# Patient Record
Sex: MALE | Race: WHITE
[De-identification: names, ages, dates, MRNs, and addresses within clinical notes are randomized per-mention and may not be internally consistent; named-entity substitution may affect disease eponyms.]

---

## 2019-02-21 ENCOUNTER — HOSPITAL ENCOUNTER (OUTPATIENT)
Dept: HOSPITAL 47 - ORWHC2ENDO | Age: 64
Discharge: HOME | End: 2019-02-21
Payer: OTHER GOVERNMENT

## 2019-02-21 VITALS — BODY MASS INDEX: 28.8 KG/M2

## 2019-02-21 VITALS — DIASTOLIC BLOOD PRESSURE: 78 MMHG | SYSTOLIC BLOOD PRESSURE: 143 MMHG | HEART RATE: 78 BPM

## 2019-02-21 VITALS — TEMPERATURE: 97.7 F

## 2019-02-21 VITALS — RESPIRATION RATE: 18 BRPM

## 2019-02-21 DIAGNOSIS — Z87.891: ICD-10-CM

## 2019-02-21 DIAGNOSIS — Z79.899: ICD-10-CM

## 2019-02-21 DIAGNOSIS — K57.30: ICD-10-CM

## 2019-02-21 DIAGNOSIS — G35: ICD-10-CM

## 2019-02-21 DIAGNOSIS — K21.9: ICD-10-CM

## 2019-02-21 DIAGNOSIS — Z12.11: Primary | ICD-10-CM

## 2019-02-21 PROCEDURE — 45378 DIAGNOSTIC COLONOSCOPY: CPT

## 2019-02-21 NOTE — P.PCN
Date of Procedure: 02/21/19


Procedure(s) Performed: 


Procedure: Total colonoscopy.





Preoperative diagnosis: Screening for neoplasia.





Postoperative diagnosis: Sigmoid diverticulosis with no evidence of acute 

diverticulitis, strictures, polyps or cancer.





Preparation: HalfLytely prep.





Sedation: Was provided by anesthesia.





Brief clinical history: The patient is a 63-year-old male who is scheduled for 

this evaluation for screening for neoplasia age being his risk factor.  The 

patient has no family history of colon cancer and he does not have any 

abdominal complaints, bleeding or anemia.  This would be his first colonoscopy.





Procedure: With the patient on his left lateral decubitus position and after 

informed consent and adequate sedation, the perianal area was inspected and it 

did not show any fissures or fistulas.  There were no masses felt on digital 

rectal examination.  The Olympus CFH 190L video colonoscope was then inserted 

in the rectum in the usual fashion and advanced to the cecum.  There were 

several diverticular orifices seen scattered in the sigmoid but there was no 

evidence of acute diverticulitis or strictures.  No polyps or tumors were seen.

  The mucosa appeared healthy.  I retroflexed the endoscope in the rectum 

before the endoscope was withdrawn.





The patient tolerated the procedure well.





Plan: The patient was reassured.  Discussed dietary measures.  He will follow 

up with you as planned and consideration will be made for repeat exam in 10 

years.  He will discuss that with you.

## 2021-08-27 ENCOUNTER — HOSPITAL ENCOUNTER (OUTPATIENT)
Dept: HOSPITAL 47 - LABWHC1 | Age: 66
Discharge: HOME | End: 2021-08-27
Attending: NURSE PRACTITIONER
Payer: OTHER GOVERNMENT

## 2021-08-27 DIAGNOSIS — G35: Primary | ICD-10-CM

## 2021-08-27 LAB
BASOPHILS # BLD AUTO: 0.03 X 10*3/UL (ref 0–0.1)
BASOPHILS NFR BLD AUTO: 0.5 %
CALCIUM SPEC-MCNC: 9 MG/DL (ref 8.7–10.3)
EOSINOPHIL # BLD AUTO: 0.22 X 10*3/UL (ref 0.04–0.35)
EOSINOPHIL NFR BLD AUTO: 4 %
ERYTHROCYTE [DISTWIDTH] IN BLOOD BY AUTOMATED COUNT: 5.02 X 10*6/UL (ref 4.4–5.6)
ERYTHROCYTE [DISTWIDTH] IN BLOOD: 12.3 % (ref 11.5–14.5)
HBV SURFACE AB SERPL IA-ACNC: <3.5 MIU/ML
HCT VFR BLD AUTO: 43.1 % (ref 39.6–50)
HGB BLD-MCNC: 13.8 G/DL (ref 13–17)
LYMPHOCYTES # SPEC AUTO: 2.02 X 10*3/UL (ref 0.9–5)
LYMPHOCYTES NFR SPEC AUTO: 36.7 %
MCH RBC QN AUTO: 27.5 PG (ref 27–32)
MCHC RBC AUTO-ENTMCNC: 32 G/DL (ref 32–37)
MCV RBC AUTO: 85.9 FL (ref 80–97)
MONOCYTES # BLD AUTO: 0.4 X 10*3/UL (ref 0.2–1)
MONOCYTES NFR BLD AUTO: 7.3 %
NEUTROPHILS # BLD AUTO: 2.82 X 10*3/UL (ref 1.8–7.7)
NEUTROPHILS NFR BLD AUTO: 51.1 %
PLATELET # BLD AUTO: 230 X 10*3/UL (ref 140–440)
T4 FREE SERPL-MCNC: 0.9 NG/DL (ref 0.8–1.8)
VIT B12 SERPL-MCNC: 290 PG/ML (ref 200–944)
WBC # BLD AUTO: 5.51 X 10*3/UL (ref 4.5–10)

## 2021-08-27 PROCEDURE — 86705 HEP B CORE ANTIBODY IGM: CPT

## 2021-08-27 PROCEDURE — 84591 ASSAY OF NOS VITAMIN: CPT

## 2021-08-27 PROCEDURE — 85025 COMPLETE CBC W/AUTO DIFF WBC: CPT

## 2021-08-27 PROCEDURE — 86706 HEP B SURFACE ANTIBODY: CPT

## 2021-08-27 PROCEDURE — 82310 ASSAY OF CALCIUM: CPT

## 2021-08-27 PROCEDURE — 84443 ASSAY THYROID STIM HORMONE: CPT

## 2021-08-27 PROCEDURE — 82746 ASSAY OF FOLIC ACID SERUM: CPT

## 2021-08-27 PROCEDURE — 84481 FREE ASSAY (FT-3): CPT

## 2021-08-27 PROCEDURE — 87390 HIV-1 AG IA: CPT

## 2021-08-27 PROCEDURE — 84425 ASSAY OF VITAMIN B-1: CPT

## 2021-08-27 PROCEDURE — 36415 COLL VENOUS BLD VENIPUNCTURE: CPT

## 2021-08-27 PROCEDURE — 82306 VITAMIN D 25 HYDROXY: CPT

## 2021-08-27 PROCEDURE — 82607 VITAMIN B-12: CPT

## 2021-08-27 PROCEDURE — 84207 ASSAY OF VITAMIN B-6: CPT

## 2021-08-27 PROCEDURE — 86704 HEP B CORE ANTIBODY TOTAL: CPT

## 2021-08-27 PROCEDURE — 84439 ASSAY OF FREE THYROXINE: CPT

## 2021-08-27 PROCEDURE — 86787 VARICELLA-ZOSTER ANTIBODY: CPT

## 2021-08-27 PROCEDURE — 87340 HEPATITIS B SURFACE AG IA: CPT

## 2025-02-26 ENCOUNTER — HOSPITAL ENCOUNTER (OUTPATIENT)
Dept: HOSPITAL 47 - LABPAT | Age: 70
Discharge: HOME | End: 2025-02-26
Attending: ORTHOPAEDIC SURGERY
Payer: OTHER GOVERNMENT

## 2025-02-26 DIAGNOSIS — Z01.812: Primary | ICD-10-CM

## 2025-02-26 DIAGNOSIS — M17.12: ICD-10-CM

## 2025-02-26 DIAGNOSIS — Z22.322: ICD-10-CM

## 2025-02-26 PROCEDURE — 87070 CULTURE OTHR SPECIMN AEROBIC: CPT

## 2025-04-01 ENCOUNTER — HOSPITAL ENCOUNTER (OUTPATIENT)
Dept: HOSPITAL 47 - OR | Age: 70
LOS: 1 days | Discharge: HOME HEALTH | End: 2025-04-02
Attending: ORTHOPAEDIC SURGERY
Payer: OTHER GOVERNMENT

## 2025-04-01 VITALS — BODY MASS INDEX: 26.4 KG/M2

## 2025-04-01 DIAGNOSIS — I10: ICD-10-CM

## 2025-04-01 DIAGNOSIS — Z87.891: ICD-10-CM

## 2025-04-01 DIAGNOSIS — G35: ICD-10-CM

## 2025-04-01 DIAGNOSIS — M17.12: Primary | ICD-10-CM

## 2025-04-01 DIAGNOSIS — Z96.653: ICD-10-CM

## 2025-04-01 DIAGNOSIS — Z79.01: ICD-10-CM

## 2025-04-01 DIAGNOSIS — Z79.899: ICD-10-CM

## 2025-04-01 PROCEDURE — 97161 PT EVAL LOW COMPLEX 20 MIN: CPT

## 2025-04-01 PROCEDURE — 83735 ASSAY OF MAGNESIUM: CPT

## 2025-04-01 PROCEDURE — 27447 TOTAL KNEE ARTHROPLASTY: CPT

## 2025-04-01 PROCEDURE — 64448 NJX AA&/STRD FEM NRV NFS IMG: CPT

## 2025-04-01 PROCEDURE — 85025 COMPLETE CBC W/AUTO DIFF WBC: CPT

## 2025-04-01 PROCEDURE — 73560 X-RAY EXAM OF KNEE 1 OR 2: CPT

## 2025-04-01 PROCEDURE — 64474 LWR XTR FSCL PLN BLK UNI NFS: CPT

## 2025-04-01 PROCEDURE — 80048 BASIC METABOLIC PNL TOTAL CA: CPT

## 2025-04-01 RX ADMIN — POTASSIUM CHLORIDE ONE MLS: 14.9 INJECTION, SOLUTION INTRAVENOUS at 15:33

## 2025-04-01 RX ADMIN — MIDAZOLAM PRN MG: 1 INJECTION INTRAMUSCULAR; INTRAVENOUS at 07:12

## 2025-04-01 RX ADMIN — ACETAMINOPHEN PRN MG: 500 TABLET ORAL at 07:00

## 2025-04-01 RX ADMIN — SODIUM CHLORIDE PRN ML: 9 INJECTION, SOLUTION INTRAVENOUS at 09:48

## 2025-04-01 RX ADMIN — POTASSIUM CHLORIDE ONE MLS: 14.9 INJECTION, SOLUTION INTRAVENOUS at 08:00

## 2025-04-01 RX ADMIN — DOCUSATE SODIUM AND SENNOSIDES SCH EACH: 50; 8.6 TABLET ORAL at 23:20

## 2025-04-01 RX ADMIN — HYDROCODONE BITARTRATE AND ACETAMINOPHEN PRN EACH: 7.5; 325 TABLET ORAL at 16:45

## 2025-04-01 RX ADMIN — POTASSIUM CHLORIDE ONE MLS: 14.9 INJECTION, SOLUTION INTRAVENOUS at 07:05

## 2025-04-01 RX ADMIN — POTASSIUM CHLORIDE SCH MLS/HR: 14.9 INJECTION, SOLUTION INTRAVENOUS at 06:59

## 2025-04-01 RX ADMIN — CEFAZOLIN ONE MLS: 330 INJECTION, POWDER, FOR SOLUTION INTRAMUSCULAR; INTRAVENOUS at 07:55

## 2025-04-01 RX ADMIN — DEXAMETHASONE SODIUM PHOSPHATE ONE MG: 4 INJECTION, SOLUTION INTRAMUSCULAR; INTRAVENOUS at 06:59

## 2025-04-01 RX ADMIN — ONDANSETRON ONE MG: 2 INJECTION INTRAMUSCULAR; INTRAVENOUS at 07:00

## 2025-04-01 RX ADMIN — BRIMONIDINE TARTRATE SCH DROPS: 2 SOLUTION/ DROPS OPHTHALMIC at 23:21

## 2025-04-01 RX ADMIN — POTASSIUM CHLORIDE ONE MLS: 14.9 INJECTION, SOLUTION INTRAVENOUS at 08:37

## 2025-04-01 RX ADMIN — MELOXICAM PRN MG: 7.5 TABLET ORAL at 07:00

## 2025-04-01 RX ADMIN — HYDROMORPHONE HYDROCHLORIDE PRN MG: 1 INJECTION, SOLUTION INTRAMUSCULAR; INTRAVENOUS; SUBCUTANEOUS at 16:17

## 2025-04-01 RX ADMIN — TIMOLOL MALEATE SCH DROPS: 5 SOLUTION OPHTHALMIC at 23:21

## 2025-04-01 RX ADMIN — DORZOLAMIDE HYDROCHLORIDE SCH DROPS: 20 SOLUTION/ DROPS OPHTHALMIC at 23:22

## 2025-04-01 RX ADMIN — HYDROMORPHONE HYDROCHLORIDE PRN MG: 1 INJECTION, SOLUTION INTRAMUSCULAR; INTRAVENOUS; SUBCUTANEOUS at 10:01

## 2025-04-01 RX ADMIN — PREDNISOLONE ACETATE SCH DROPS: 10 SUSPENSION/ DROPS OPHTHALMIC at 23:21

## 2025-04-02 VITALS
RESPIRATION RATE: 15 BRPM | HEART RATE: 58 BPM | TEMPERATURE: 97.3 F | SYSTOLIC BLOOD PRESSURE: 108 MMHG | DIASTOLIC BLOOD PRESSURE: 75 MMHG

## 2025-04-02 LAB
BASOPHILS # BLD AUTO: 0.03 X 10*3/UL (ref 0–0.1)
BASOPHILS NFR BLD AUTO: 0.2 %
BUN SERPL-SCNC: 13.2 MG/DL (ref 9–27)
BUN/CREAT SERPL: 14.67 RATIO (ref 12–20)
CALCIUM SPEC-MCNC: 8.7 MG/DL (ref 8.7–10.3)
CHLORIDE SERPL-SCNC: 101 MMOL/L (ref 96–109)
CO2 SERPL-SCNC: 24.8 MMOL/L (ref 21.6–31.8)
EOSINOPHIL # BLD AUTO: 0 X 10*3/UL (ref 0.04–0.35)
EOSINOPHIL NFR BLD AUTO: 0 %
ERYTHROCYTE [DISTWIDTH] IN BLOOD: 12.6 % (ref 11.5–14.5)
GLUCOSE SERPL-MCNC: 100 MG/DL (ref 70–110)
HCT VFR BLD AUTO: 41.9 % (ref 39.6–50)
HGB BLD-MCNC: 13.2 G/DL (ref 13–17)
LYMPHOCYTES # SPEC AUTO: 1.31 X 10*3/UL (ref 0.9–5)
LYMPHOCYTES NFR SPEC AUTO: 7.5 %
MAGNESIUM SPEC-SCNC: 1.8 MG/DL (ref 1.5–2.4)
MCH RBC QN AUTO: 27.5 PG (ref 27–32)
MCHC RBC AUTO-ENTMCNC: 31.5 G/DL (ref 32–37)
MCV RBC AUTO: 87.3 FL (ref 80–97)
MONOCYTES # BLD AUTO: 1.03 X 10*3/UL (ref 0.2–1)
MONOCYTES NFR BLD AUTO: 5.9 %
NEUTROPHILS # BLD AUTO: 15.02 X 10*3/UL (ref 1.8–7.7)
NEUTROPHILS NFR BLD AUTO: 85.9 %
NRBC BLD AUTO-RTO: 0 X 10*3/UL (ref 0–0.01)
PLATELET # BLD AUTO: 224 X 10*3/UL (ref 140–440)
SODIUM SERPL-SCNC: 139 MMOL/L (ref 135–145)
WBC # BLD AUTO: 17.48 X 10*3/UL (ref 4.5–10)

## 2025-04-02 RX ADMIN — RIVAROXABAN SCH MG: 10 TABLET, FILM COATED ORAL at 08:11

## 2025-05-12 ENCOUNTER — HOSPITAL ENCOUNTER (OUTPATIENT)
Dept: HOSPITAL 47 - RADUSWWP | Age: 70
Discharge: HOME | End: 2025-05-12
Attending: FAMILY MEDICINE
Payer: OTHER GOVERNMENT

## 2025-05-12 DIAGNOSIS — I10: ICD-10-CM

## 2025-05-12 DIAGNOSIS — Z13.6: Primary | ICD-10-CM

## 2025-05-12 PROCEDURE — 93979 VASCULAR STUDY: CPT
